# Patient Record
Sex: FEMALE | Race: NATIVE HAWAIIAN OR OTHER PACIFIC ISLANDER | HISPANIC OR LATINO | URBAN - METROPOLITAN AREA
[De-identification: names, ages, dates, MRNs, and addresses within clinical notes are randomized per-mention and may not be internally consistent; named-entity substitution may affect disease eponyms.]

---

## 2021-10-12 ENCOUNTER — OFFICE VISIT (OUTPATIENT)
Dept: URBAN - METROPOLITAN AREA CLINIC 12 | Facility: CLINIC | Age: 51
End: 2021-10-12

## 2021-10-12 ENCOUNTER — DASHBOARD ENCOUNTERS (OUTPATIENT)
Age: 51
End: 2021-10-12

## 2021-10-12 VITALS
WEIGHT: 150.6 LBS | BODY MASS INDEX: 25.71 KG/M2 | HEART RATE: 60 BPM | DIASTOLIC BLOOD PRESSURE: 73 MMHG | SYSTOLIC BLOOD PRESSURE: 113 MMHG | TEMPERATURE: 97.1 F | HEIGHT: 64 IN

## 2021-10-12 DIAGNOSIS — Z80.0 FAMILY HISTORY OF COLON CANCER: ICD-10-CM

## 2021-10-12 DIAGNOSIS — Z12.11 COLON CANCER SCREENING: ICD-10-CM

## 2021-10-12 NOTE — HPI-TODAY'S VISIT:
50F here for screening. FASP Father had CA colon at 72 never had colon  Bowel movements every other day. No blood in stool. No change in stool caliber.  Feels fine. No complaints

## 2021-11-10 ENCOUNTER — OFFICE VISIT (OUTPATIENT)
Dept: URBAN - METROPOLITAN AREA LAB 1 | Facility: LAB | Age: 51
End: 2021-11-10

## 2021-12-08 ENCOUNTER — OFFICE VISIT (OUTPATIENT)
Dept: URBAN - METROPOLITAN AREA LAB 1 | Facility: LAB | Age: 51
End: 2021-12-08
Payer: COMMERCIAL

## 2021-12-08 PROCEDURE — G0121 COLON CA SCRN NOT HI RSK IND: HCPCS | Performed by: INTERNAL MEDICINE
